# Patient Record
Sex: MALE | Race: WHITE
[De-identification: names, ages, dates, MRNs, and addresses within clinical notes are randomized per-mention and may not be internally consistent; named-entity substitution may affect disease eponyms.]

---

## 2018-05-31 ENCOUNTER — HOSPITAL ENCOUNTER (OUTPATIENT)
Dept: HOSPITAL 62 - OD | Age: 28
End: 2018-05-31
Attending: ORTHOPAEDIC SURGERY
Payer: OTHER GOVERNMENT

## 2018-05-31 DIAGNOSIS — Z01.818: Primary | ICD-10-CM

## 2018-05-31 DIAGNOSIS — Z96.649: ICD-10-CM

## 2018-05-31 LAB
ADD MANUAL DIFF: NO
ANION GAP SERPL CALC-SCNC: 17 MMOL/L (ref 5–19)
APPEARANCE UR: CLEAR
APTT PPP: YELLOW S
BASOPHILS # BLD AUTO: 0 10^3/UL (ref 0–0.2)
BASOPHILS NFR BLD AUTO: 0.5 % (ref 0–2)
BILIRUB UR QL STRIP: NEGATIVE
BUN SERPL-MCNC: 21 MG/DL (ref 7–20)
CALCIUM: 10.2 MG/DL (ref 8.4–10.2)
CHLORIDE SERPL-SCNC: 100 MMOL/L (ref 98–107)
CO2 SERPL-SCNC: 27 MMOL/L (ref 22–30)
EOSINOPHIL # BLD AUTO: 0.1 10^3/UL (ref 0–0.6)
EOSINOPHIL NFR BLD AUTO: 1.5 % (ref 0–6)
ERYTHROCYTE [DISTWIDTH] IN BLOOD BY AUTOMATED COUNT: 12.7 % (ref 11.5–14)
GLUCOSE SERPL-MCNC: 86 MG/DL (ref 75–110)
GLUCOSE UR STRIP-MCNC: NEGATIVE MG/DL
HCT VFR BLD CALC: 48.8 % (ref 37.9–51)
HGB BLD-MCNC: 16.9 G/DL (ref 13.5–17)
KETONES UR STRIP-MCNC: NEGATIVE MG/DL
LYMPHOCYTES # BLD AUTO: 2 10^3/UL (ref 0.5–4.7)
LYMPHOCYTES NFR BLD AUTO: 29.3 % (ref 13–45)
MCH RBC QN AUTO: 31.4 PG (ref 27–33.4)
MCHC RBC AUTO-ENTMCNC: 34.7 G/DL (ref 32–36)
MCV RBC AUTO: 91 FL (ref 80–97)
MONOCYTES # BLD AUTO: 0.5 10^3/UL (ref 0.1–1.4)
MONOCYTES NFR BLD AUTO: 7.2 % (ref 3–13)
NEUTROPHILS # BLD AUTO: 4.2 10^3/UL (ref 1.7–8.2)
NEUTS SEG NFR BLD AUTO: 61.5 % (ref 42–78)
NITRITE UR QL STRIP: NEGATIVE
PH UR STRIP: 5 [PH] (ref 5–9)
PLATELET # BLD: 226 10^3/UL (ref 150–450)
POTASSIUM SERPL-SCNC: 4.7 MMOL/L (ref 3.6–5)
PROT UR STRIP-MCNC: NEGATIVE MG/DL
RBC # BLD AUTO: 5.38 10^6/UL (ref 4.35–5.55)
SODIUM SERPL-SCNC: 143.8 MMOL/L (ref 137–145)
SP GR UR STRIP: 1.02
TOTAL CELLS COUNTED % (AUTO): 100 %
UROBILINOGEN UR-MCNC: NEGATIVE MG/DL (ref ?–2)
WBC # BLD AUTO: 6.8 10^3/UL (ref 4–10.5)

## 2018-05-31 PROCEDURE — 36415 COLL VENOUS BLD VENIPUNCTURE: CPT

## 2018-05-31 PROCEDURE — 93010 ELECTROCARDIOGRAM REPORT: CPT

## 2018-05-31 PROCEDURE — 81001 URINALYSIS AUTO W/SCOPE: CPT

## 2018-05-31 PROCEDURE — 85025 COMPLETE CBC W/AUTO DIFF WBC: CPT

## 2018-05-31 PROCEDURE — 80048 BASIC METABOLIC PNL TOTAL CA: CPT

## 2018-05-31 PROCEDURE — 71046 X-RAY EXAM CHEST 2 VIEWS: CPT

## 2018-05-31 PROCEDURE — 93005 ELECTROCARDIOGRAM TRACING: CPT

## 2018-05-31 NOTE — RADIOLOGY REPORT (SQ)
EXAM DESCRIPTION:  CHEST PA/LATERAL



COMPLETED DATE/TIME:  5/31/2018 3:11 pm



REASON FOR STUDY:  PRE OP; PRESENCE OF UNSPECIFIED ARTIFICIAL HIP JOINT (Z96.649)



COMPARISON:  None.



EXAM PARAMETERS:  NUMBER OF VIEWS: two views

TECHNIQUE: Digital Frontal and Lateral radiographic views of the chest acquired.

RADIATION DOSE: NA

LIMITATIONS: none



FINDINGS:  LUNGS AND PLEURA: No opacities, masses or pneumothorax. No pleural effusion.

MEDIASTINUM AND HILAR STRUCTURES: No masses or contour abnormalities.

HEART AND VASCULAR STRUCTURES: Heart normal size.  No evidence for failure.

BONES: No acute findings.

HARDWARE: None in the chest.

OTHER: No other significant finding.



IMPRESSION:  NO SIGNIFICANT RADIOGRAPHIC FINDING IN THE CHEST.



TECHNICAL DOCUMENTATION:  JOB ID:  [id number]

 2011 [employer]- All Rights Reserved



Reading location - IP/workstation name: [email or web address]

## 2018-06-11 ENCOUNTER — HOSPITAL ENCOUNTER (INPATIENT)
Dept: HOSPITAL 62 - INOR | Age: 28
LOS: 2 days | Discharge: HOME HEALTH | DRG: 470 | End: 2018-06-13
Attending: ORTHOPAEDIC SURGERY | Admitting: ORTHOPAEDIC SURGERY
Payer: OTHER GOVERNMENT

## 2018-06-11 DIAGNOSIS — Z79.899: ICD-10-CM

## 2018-06-11 DIAGNOSIS — M16.11: Primary | ICD-10-CM

## 2018-06-11 PROCEDURE — 86900 BLOOD TYPING SEROLOGIC ABO: CPT

## 2018-06-11 PROCEDURE — 72170 X-RAY EXAM OF PELVIS: CPT

## 2018-06-11 PROCEDURE — 01214 ANES OPEN PX TOT HIP ARTHRP: CPT

## 2018-06-11 PROCEDURE — C9290 INJ, BUPIVACAINE LIPOSOME: HCPCS

## 2018-06-11 PROCEDURE — 88304 TISSUE EXAM BY PATHOLOGIST: CPT

## 2018-06-11 PROCEDURE — 0SR9029 REPLACEMENT OF RIGHT HIP JOINT WITH METAL ON POLYETHYLENE SYNTHETIC SUBSTITUTE, CEMENTED, OPEN APPROACH: ICD-10-PCS | Performed by: ORTHOPAEDIC SURGERY

## 2018-06-11 PROCEDURE — 86901 BLOOD TYPING SEROLOGIC RH(D): CPT

## 2018-06-11 PROCEDURE — 94799 UNLISTED PULMONARY SVC/PX: CPT

## 2018-06-11 PROCEDURE — 86850 RBC ANTIBODY SCREEN: CPT

## 2018-06-11 PROCEDURE — 85027 COMPLETE CBC AUTOMATED: CPT

## 2018-06-11 PROCEDURE — 36415 COLL VENOUS BLD VENIPUNCTURE: CPT

## 2018-06-11 PROCEDURE — 80048 BASIC METABOLIC PNL TOTAL CA: CPT

## 2018-06-11 PROCEDURE — 88311 DECALCIFY TISSUE: CPT

## 2018-06-11 RX ADMIN — OXYCODONE HYDROCHLORIDE SCH MG: 10 TABLET, FILM COATED, EXTENDED RELEASE ORAL at 21:44

## 2018-06-11 RX ADMIN — MORPHINE SULFATE PRN MG: 10 INJECTION INTRAMUSCULAR; INTRAVENOUS; SUBCUTANEOUS at 17:33

## 2018-06-11 RX ADMIN — THROMBIN, TOPICAL (BOVINE) ONE UNIT: KIT at 10:30

## 2018-06-11 RX ADMIN — BUPIVACAINE PRN MG: 13.3 INJECTION, SUSPENSION, LIPOSOMAL INFILTRATION at 07:29

## 2018-06-11 RX ADMIN — MORPHINE SULFATE PRN MG: 10 INJECTION INTRAMUSCULAR; INTRAVENOUS; SUBCUTANEOUS at 19:03

## 2018-06-11 RX ADMIN — MORPHINE SULFATE PRN MG: 10 INJECTION INTRAMUSCULAR; INTRAVENOUS; SUBCUTANEOUS at 14:56

## 2018-06-11 RX ADMIN — BUPIVACAINE PRN MG: 13.3 INJECTION, SUSPENSION, LIPOSOMAL INFILTRATION at 10:30

## 2018-06-11 RX ADMIN — MORPHINE SULFATE PRN MG: 10 INJECTION INTRAMUSCULAR; INTRAVENOUS; SUBCUTANEOUS at 20:54

## 2018-06-11 RX ADMIN — TOPIRAMATE SCH MG: 100 TABLET, FILM COATED ORAL at 21:46

## 2018-06-11 RX ADMIN — THROMBIN, TOPICAL (BOVINE) ONE UNIT: KIT at 07:31

## 2018-06-11 RX ADMIN — MORPHINE SULFATE PRN MG: 10 INJECTION INTRAMUSCULAR; INTRAVENOUS; SUBCUTANEOUS at 13:37

## 2018-06-11 RX ADMIN — OXYCODONE HYDROCHLORIDE PRN MG: 5 TABLET ORAL at 16:05

## 2018-06-11 RX ADMIN — Medication SCH ML: at 13:56

## 2018-06-11 RX ADMIN — SENNOSIDES, DOCUSATE SODIUM SCH EACH: 50; 8.6 TABLET, FILM COATED ORAL at 17:34

## 2018-06-11 RX ADMIN — Medication SCH ML: at 21:46

## 2018-06-11 NOTE — RADIOLOGY REPORT (SQ)
EXAM DESCRIPTION:  PELVIS AP



COMPLETED DATE/TIME:  6/11/2018 12:09 pm



REASON FOR STUDY:  Post Op  Long Cassette in PACU  [id number]  PAIN IN RIGHT HIP



COMPARISON:  None.



NUMBER OF VIEWS:  AP view of the right hip and bony pelvis



TECHNIQUE:  Digital radiographic images of the right hip post-procedure.



LIMITATIONS:  None.



FINDINGS:  BONES: No worrisome or unexpected findings post-procedure.

DEVICE: Right femoral head replacement, acetabular component without anchoring screws

SOFT TISSUES:  No worrisome findings.  Expected postoperative soft tissue changes.  Old L5-S1 anterio
r fusion.



IMPRESSION:   SATISFACTORY POSTOPERATIVE RIGHT HIP.



TECHNICAL DOCUMENTATION:  JOB ID:  [id number]

 2011 [employer]- All Rights Reserved



Reading location - IP/workstation name: [email or web address]

## 2018-06-11 NOTE — OPERATIVE REPORT
Operative Report


DATE OF SURGERY: 06/11/18


PREOPERATIVE DIAGNOSIS: Right hip arthritis


OPERATION: Right resurfacing total hip arthroplasty


SURGEON: CYNTHIA WHITFIELD


ANESTHESIA: GA


TISSUE REMOVED OR ALTERED: Bone to pathology


ESTIMATED BLOOD LOSS: 100


PROCEDURE: 





Implants used:


Femur: Biomet 48 mm BHR femoral head





Acetabular shell: 56 mm BHR acetabular cup





Liner: None





Head: None


The patient is placed in a left lateral decubitus position on the operating 

table.  The right lower extremity and hindquarter is prepped and draped in a 

sterile fashion.  A curvilinear incision was made over the greater trochanter a 

posterior approach the hip was taken.  The femoral head is dislocated and 

retracted anteriorly to expose the underlying acetabulum.  Soft tissues cleared 

off the room and the acetabular using a sharp knife.  The anterior capsule 

section allowing for the translocation of the femoral head anteriorly.  The 

acetabular was then prepared using a series of jamin Subsequently a 56 

millimeters Avelina titanium hemispherical shell is impacted into position.  

The impaction liner was removed after cutting the wires.  Attention was next 

turned to the femur.





Access is gained to the through the proximal femur using an alignment guide 

that is based off the femoral neck.  A pin is placed down from the femoral head 

through the neck and out the lateral cortex of that I can ascertain that in 

fact the alignment is in valgus as opposed to varus..  The femur is then 

prepared using a series of reamers until a 48 mm reamer seated at the verge of 

the femoral neck and head junction.  To further reamers were then used to 

prepare the femoral head both to flatten the top and to make a conical 

component proximally.  A trial reduction was performed and is felt to be 

adequate.  Preoperative leg length was recreated and is excellent anterior 

posterior stability.  A decision was made to proceed with the above construct.





The trial femoral component is removed..  The wound is irrigated with pulsed 

lavage.  Polymethyl methacrylate with tobramycin is mixed and used to cement 

the 48 BHR head onto the femoral neck and head.   The hip was reduced.  Wound 

is copiously irrigated with pulsed lavage.  Sent closed in layers using 

interrupted Vicryl followed by staples.  A sterile dressing is applied and the 

patient's returned to recovery room in satisfactory patient.

## 2018-06-12 LAB
ANION GAP SERPL CALC-SCNC: 10 MMOL/L (ref 5–19)
BUN SERPL-MCNC: 14 MG/DL (ref 7–20)
CALCIUM: 8.6 MG/DL (ref 8.4–10.2)
CHLORIDE SERPL-SCNC: 105 MMOL/L (ref 98–107)
CO2 SERPL-SCNC: 26 MMOL/L (ref 22–30)
ERYTHROCYTE [DISTWIDTH] IN BLOOD BY AUTOMATED COUNT: 12.6 % (ref 11.5–14)
GLUCOSE SERPL-MCNC: 142 MG/DL (ref 75–110)
HCT VFR BLD CALC: 38.4 % (ref 37.9–51)
HGB BLD-MCNC: 13.4 G/DL (ref 13.5–17)
MCH RBC QN AUTO: 31.4 PG (ref 27–33.4)
MCHC RBC AUTO-ENTMCNC: 34.8 G/DL (ref 32–36)
MCV RBC AUTO: 90 FL (ref 80–97)
PLATELET # BLD: 163 10^3/UL (ref 150–450)
POTASSIUM SERPL-SCNC: 3.9 MMOL/L (ref 3.6–5)
RBC # BLD AUTO: 4.26 10^6/UL (ref 4.35–5.55)
SODIUM SERPL-SCNC: 140.7 MMOL/L (ref 137–145)
WBC # BLD AUTO: 12 10^3/UL (ref 4–10.5)

## 2018-06-12 RX ADMIN — MORPHINE SULFATE PRN MG: 10 INJECTION INTRAMUSCULAR; INTRAVENOUS; SUBCUTANEOUS at 02:37

## 2018-06-12 RX ADMIN — TOPIRAMATE SCH MG: 100 TABLET, FILM COATED ORAL at 21:48

## 2018-06-12 RX ADMIN — OXYCODONE HYDROCHLORIDE PRN MG: 5 TABLET ORAL at 11:36

## 2018-06-12 RX ADMIN — OXYCODONE HYDROCHLORIDE SCH MG: 10 TABLET, FILM COATED, EXTENDED RELEASE ORAL at 10:02

## 2018-06-12 RX ADMIN — OXYCODONE HYDROCHLORIDE SCH MG: 10 TABLET, FILM COATED, EXTENDED RELEASE ORAL at 21:47

## 2018-06-12 RX ADMIN — ASPIRIN SCH MG: 81 TABLET, COATED ORAL at 10:02

## 2018-06-12 RX ADMIN — SENNOSIDES, DOCUSATE SODIUM SCH EACH: 50; 8.6 TABLET, FILM COATED ORAL at 18:23

## 2018-06-12 RX ADMIN — Medication SCH ML: at 15:59

## 2018-06-12 RX ADMIN — MORPHINE SULFATE PRN MG: 10 INJECTION INTRAMUSCULAR; INTRAVENOUS; SUBCUTANEOUS at 05:24

## 2018-06-12 RX ADMIN — Medication SCH ML: at 05:25

## 2018-06-12 RX ADMIN — OXYCODONE HYDROCHLORIDE PRN MG: 5 TABLET ORAL at 20:14

## 2018-06-12 RX ADMIN — LANSOPRAZOLE SCH MG: 30 TABLET, ORALLY DISINTEGRATING, DELAYED RELEASE ORAL at 05:25

## 2018-06-12 RX ADMIN — TOPIRAMATE SCH MG: 100 TABLET, FILM COATED ORAL at 10:02

## 2018-06-12 RX ADMIN — SENNOSIDES, DOCUSATE SODIUM SCH EACH: 50; 8.6 TABLET, FILM COATED ORAL at 10:02

## 2018-06-12 RX ADMIN — Medication SCH ML: at 21:50

## 2018-06-12 RX ADMIN — Medication SCH CAP: at 10:01

## 2018-06-12 NOTE — PDOC PROGRESS REPORT
Subjective


Progress Note for:: 06/12/18


Reason For Visit: 


R HIP OA


28-year-old white male postop day 1 right hip arthroplasty.  Patient 

complaining of some discomfort and stiffness last night.





Physical Exam


Vital Signs: 


 











Temp Pulse Resp BP Pulse Ox


 


 36.8 C   74   16   128/73 H  100 


 


 06/11/18 23:17  06/11/18 23:17  06/11/18 23:17  06/11/18 23:17  06/11/18 23:17








 Intake & Output











 06/10/18 06/11/18 06/12/18





 06:59 06:59 06:59


 


Intake Total   5174


 


Output Total   4450


 


Balance   724


 


Weight   94.3 kg











General appearance: PRESENT: mild distress


Head exam: PRESENT: normocephalic


Respiratory exam: PRESENT: unlabored


Cardiovascular exam: PRESENT: RRR


Pulses: PRESENT: +1 pedal pulses bilateral


Vascular exam: PRESENT: normal capillary refill


GI/Abdominal exam: PRESENT: soft


Rectal exam: PRESENT: deferred


Extremities exam: PRESENT: other - Right hip dressing saturated.  Is changed 

with a fresh OpSite.  Leg lengths are equal.  Distal neurovascular examination 

is intact.


Neurological exam: PRESENT: alert, awake, oriented to person, oriented to place

, oriented to time, oriented to situation.  ABSENT: motor sensory deficit


Psychiatric exam: PRESENT: appropriate affect, normal mood.  ABSENT: homicidal 

ideation, suicidal ideation


Skin exam: PRESENT: dry, intact, warm.  ABSENT: cyanosis, rash





Results


Laboratory Results: 


 





 06/12/18 04:29 





 06/12/18 04:29 





 











  06/11/18 06/12/18 06/12/18





  08:04 04:29 04:29


 


WBC   12.0 H 


 


RBC   4.26 L 


 


Hgb   13.4 L 


 


Hct   38.4 


 


MCV   90 


 


MCH   31.4 


 


MCHC   34.8 


 


RDW   12.6 


 


Plt Count   163 


 


Sodium    140.7


 


Potassium    3.9


 


Chloride    105


 


Carbon Dioxide    26


 


Anion Gap    10


 


BUN    14


 


Creatinine    0.89


 


Est GFR ( Amer)    > 60


 


Est GFR (Non-Af Amer)    > 60


 


Glucose    142 H


 


Calcium    8.6


 


Blood Type  O POSITIVE  


 


Antibody Screen  NEGATIVE  











Impressions: 


 





Pelvis X-Ray  06/11/18 11:24


IMPRESSION:   SATISFACTORY POSTOPERATIVE RIGHT HIP.


 











Status: Imported from PACS





Assessment & Plan





- Diagnosis


(1) Arthritis of right hip


Is this a current diagnosis for this admission?: Yes   


Plan: 


Patient received no physical therapy yesterday because of prolonged spinal.  

Will be seen by physical therapy today and will be mobilized on a weightbearing 

as tolerated basis.  Anticipate discharge home tomorrow with home health 

services.








- Time


Time Spent with patient: 15-24 minutes


Anticipated discharge: Home with Homehealth


Within: within 24 hours

## 2018-06-13 VITALS — DIASTOLIC BLOOD PRESSURE: 68 MMHG | SYSTOLIC BLOOD PRESSURE: 107 MMHG

## 2018-06-13 LAB
ERYTHROCYTE [DISTWIDTH] IN BLOOD BY AUTOMATED COUNT: 12.8 % (ref 11.5–14)
HCT VFR BLD CALC: 39.8 % (ref 37.9–51)
HGB BLD-MCNC: 13.6 G/DL (ref 13.5–17)
MCH RBC QN AUTO: 31.3 PG (ref 27–33.4)
MCHC RBC AUTO-ENTMCNC: 34.2 G/DL (ref 32–36)
MCV RBC AUTO: 92 FL (ref 80–97)
PLATELET # BLD: 162 10^3/UL (ref 150–450)
RBC # BLD AUTO: 4.33 10^6/UL (ref 4.35–5.55)
WBC # BLD AUTO: 11.1 10^3/UL (ref 4–10.5)

## 2018-06-13 RX ADMIN — OXYCODONE HYDROCHLORIDE SCH MG: 10 TABLET, FILM COATED, EXTENDED RELEASE ORAL at 09:04

## 2018-06-13 RX ADMIN — TOPIRAMATE SCH MG: 100 TABLET, FILM COATED ORAL at 09:04

## 2018-06-13 RX ADMIN — Medication SCH CAP: at 09:04

## 2018-06-13 RX ADMIN — LANSOPRAZOLE SCH MG: 30 TABLET, ORALLY DISINTEGRATING, DELAYED RELEASE ORAL at 06:06

## 2018-06-13 RX ADMIN — ASPIRIN SCH MG: 81 TABLET, COATED ORAL at 09:04

## 2018-06-13 RX ADMIN — Medication SCH ML: at 06:07

## 2018-06-13 RX ADMIN — SENNOSIDES, DOCUSATE SODIUM SCH EACH: 50; 8.6 TABLET, FILM COATED ORAL at 09:04

## 2018-06-13 NOTE — PDOC DISCHARGE SUMMARY
General





- Admit/Disc Date/PCP


Admission Date/Primary Care Provider: 


  06/11/18 07:02





  GERONIMO LOVE MD





Discharge Date: 06/13/18





- Discharge Diagnosis


(1) Arthritis of right hip


Is this a current diagnosis for this admission?: Yes   





- Additional Information


Resuscitation Status: Full Code


Discharge Diet: As Tolerated, Regular


Discharge Activity: Balance Activity w/Rest, No Driving, No tub bath


Home Medications: 








Sumatriptan Succinate [Imitrex 100 mg Tablet] 100 mg PO ASDIR PRN 06/11/18 


Topiramate [Topamax 100 mg Tablet] 100 mg PO BID 06/11/18 


Zolpidem Tartrate [Ambien Cr] 12.5 mg PO QHS 06/11/18 


Aspirin [Ecotrin 81 mg EC Tablet] 81 mg PO DAILY  tabec 06/13/18 


Oxycodone HCl [Oxy-Ir 5 mg Tablet] 5 mg PO Q6HP PRN  tablet 06/13/18 











History of Present Illness


History of Present Illness: 


LIDIA CONDE JR is a 28 year old male


Patient is a 28-year-old white male with progressive right hip pain and 

functional disability secondary osteoarthritis.  Patient is admitted for 

elective right hip arthroplasty.





Hospital Course


Hospital Course: 





Patient is admitted through the operating where he undergoes unconjugated right 

hip arthroplasty using [employer] resurfacing implants.  He tolerates the 

procedure without complication.  Is returned to floor in satisfactory 

condition.  He makes excellent progress with physical therapy.  He is ready for 

discharge home with home health services.





Physical Exam


Vital Signs: 


 











Temp Pulse Resp BP Pulse Ox


 


 37.6 C   90   18   107/56 L  97 


 


 06/12/18 22:53  06/12/18 22:53  06/12/18 22:53  06/12/18 22:53  06/12/18 22:53








 Intake & Output











 06/12/18 06/13/18 06/14/18





 06:59 06:59 06:59


 


Intake Total 5174 2478 


 


Output Total 4450 1800 


 


Balance 724 678 


 


Weight 94.3 kg 94.3 kg 











General appearance: PRESENT: no acute distress


Head exam: PRESENT: normocephalic


Respiratory exam: PRESENT: unlabored


Cardiovascular exam: PRESENT: RRR


Pulses: PRESENT: normal dorsalis pedis pul


Vascular exam: PRESENT: normal capillary refill


GI/Abdominal exam: PRESENT: soft


Rectal exam: PRESENT: deferred


Extremities exam: PRESENT: other - Right hip dressing clean dry and intact.  

Leg lengths are equal.  Distal neurovascular examination is intact.


Neurological exam: PRESENT: alert, awake, oriented to person, oriented to place

, oriented to time, oriented to situation.  ABSENT: motor sensory deficit


Psychiatric exam: PRESENT: appropriate affect, normal mood.  ABSENT: homicidal 

ideation, suicidal ideation


Skin exam: PRESENT: dry, intact, warm.  ABSENT: cyanosis, rash





Results


Laboratory Results: 


 





 06/13/18 05:11 





 06/12/18 04:29 





 











  06/13/18





  05:11


 


WBC  11.1 H


 


RBC  4.33 L


 


Hgb  13.6


 


Hct  39.8


 


MCV  92


 


MCH  31.3


 


MCHC  34.2


 


RDW  12.8


 


Plt Count  162











Impressions: 


 





Pelvis X-Ray  06/11/18 11:24


IMPRESSION:   SATISFACTORY POSTOPERATIVE RIGHT HIP.


 











Status: Imported from PACS





Qualifiers





- *


PATIENT BEING DISCHARGED WITH ANY OF THE FOLLOWING DIAGNOSIS: No


VTE patient discharged on overlapping Therapy?: Yes





Plan


Discharge Plan: 





Patient to be discharged home with home health services and DME.  Follow-up 

with Dr. Johnson and [facility] for surgery in 2 weeks for staple removal.